# Patient Record
Sex: FEMALE | Race: WHITE | NOT HISPANIC OR LATINO | URBAN - NONMETROPOLITAN AREA
[De-identification: names, ages, dates, MRNs, and addresses within clinical notes are randomized per-mention and may not be internally consistent; named-entity substitution may affect disease eponyms.]

---

## 2021-01-01 ENCOUNTER — APPOINTMENT (RX ONLY)
Dept: URBAN - NONMETROPOLITAN AREA CLINIC 1 | Facility: CLINIC | Age: 41
Setting detail: DERMATOLOGY
End: 2021-01-01

## 2021-01-01 DIAGNOSIS — L82.1 OTHER SEBORRHEIC KERATOSIS: ICD-10-CM

## 2021-01-01 DIAGNOSIS — L57.8 OTHER SKIN CHANGES DUE TO CHRONIC EXPOSURE TO NONIONIZING RADIATION: ICD-10-CM | Status: STABLE

## 2021-01-01 DIAGNOSIS — D22 MELANOCYTIC NEVI: ICD-10-CM

## 2021-01-01 DIAGNOSIS — D18.0 HEMANGIOMA: ICD-10-CM

## 2021-01-01 DIAGNOSIS — L81.4 OTHER MELANIN HYPERPIGMENTATION: ICD-10-CM

## 2021-01-01 DIAGNOSIS — Z41.9 ENCOUNTER FOR PROCEDURE FOR PURPOSES OTHER THAN REMEDYING HEALTH STATE, UNSPECIFIED: ICD-10-CM

## 2021-01-01 PROCEDURE — ? ADDITIONAL NOTES

## 2021-01-01 PROCEDURE — 99203 OFFICE O/P NEW LOW 30 MIN: CPT

## 2021-01-01 PROCEDURE — ? TREATMENT REGIMEN

## 2021-01-01 PROCEDURE — ? BOTOX

## 2021-01-01 PROCEDURE — ? OBSERVATION AND MEASURE

## 2021-01-01 PROCEDURE — ? COUNSELING

## 2021-01-01 PROCEDURE — ? FULL BODY SKIN EXAM

## 2021-01-01 PROCEDURE — ? BELOTERO INJECTION

## 2021-01-01 ASSESSMENT — LOCATION ZONE DERM
LOCATION ZONE: FACE
LOCATION ZONE: ARM
LOCATION ZONE: TRUNK
LOCATION ZONE: LEG

## 2021-01-01 ASSESSMENT — LOCATION DETAILED DESCRIPTION DERM
LOCATION DETAILED: RIGHT SUPERIOR UPPER BACK
LOCATION DETAILED: LEFT INFERIOR MEDIAL UPPER BACK
LOCATION DETAILED: LEFT ANTERIOR LATERAL DISTAL THIGH
LOCATION DETAILED: LEFT VENTRAL PROXIMAL FOREARM
LOCATION DETAILED: RIGHT ANTERIOR DISTAL THIGH
LOCATION DETAILED: LEFT MEDIAL FOREHEAD
LOCATION DETAILED: RIGHT MID-UPPER BACK
LOCATION DETAILED: LEFT CENTRAL EYEBROW
LOCATION DETAILED: RIGHT MEDIAL SUPERIOR CHEST
LOCATION DETAILED: RIGHT VENTRAL PROXIMAL FOREARM
LOCATION DETAILED: LEFT SUPERIOR MEDIAL MIDBACK

## 2021-01-01 ASSESSMENT — LOCATION SIMPLE DESCRIPTION DERM
LOCATION SIMPLE: RIGHT FOREARM
LOCATION SIMPLE: LEFT FOREHEAD
LOCATION SIMPLE: LEFT EYEBROW
LOCATION SIMPLE: LEFT LOWER BACK
LOCATION SIMPLE: LEFT FOREARM
LOCATION SIMPLE: CHEST
LOCATION SIMPLE: LEFT THIGH
LOCATION SIMPLE: LEFT UPPER BACK
LOCATION SIMPLE: RIGHT UPPER BACK
LOCATION SIMPLE: RIGHT THIGH

## 2021-05-24 PROBLEM — D18.01 HEMANGIOMA OF SKIN AND SUBCUTANEOUS TISSUE: Status: ACTIVE | Noted: 2021-01-01

## 2021-05-24 PROBLEM — D22.39 MELANOCYTIC NEVI OF OTHER PARTS OF FACE: Status: ACTIVE | Noted: 2021-01-01

## 2021-05-24 PROBLEM — L81.4 OTHER MELANIN HYPERPIGMENTATION: Status: ACTIVE | Noted: 2021-01-01

## 2021-05-24 PROBLEM — D22.5 MELANOCYTIC NEVI OF TRUNK: Status: ACTIVE | Noted: 2021-01-01

## 2021-05-24 PROBLEM — L57.8 OTHER SKIN CHANGES DUE TO CHRONIC EXPOSURE TO NONIONIZING RADIATION: Status: ACTIVE | Noted: 2021-01-01

## 2021-05-24 PROBLEM — L82.1 OTHER SEBORRHEIC KERATOSIS: Status: ACTIVE | Noted: 2021-01-01

## 2021-05-24 NOTE — PROCEDURE: TREATMENT REGIMEN
Detail Level: Detailed
Plan: Advised pt to return if lesion is not healed. BX if lesion is not gone.

## 2021-06-09 PROBLEM — Z41.9 ENCOUNTER FOR PROCEDURE FOR PURPOSES OTHER THAN REMEDYING HEALTH STATE, UNSPECIFIED: Status: ACTIVE | Noted: 2021-01-01

## 2021-06-09 NOTE — PROCEDURE: BOTOX
Post-Care Instructions: Patient instructed do not lie down or bend over 4 hours after treatment. Do not manipulate treatment area and limit physical activity for 24 hours. Patient instructed not to travel by airplane for 48 hours. It can take 3-11 days to start seeing results. Please call if you have any concerns.\\n\\nResults with botox take 5 days to appear and improve up to 2 weeks post injection.  \\n\\nCall the clinic if any questions or concerns arise at [phone]. Post-Care Instructions: Patient instructed do not lie down or bend over 4 hours after treatment. Do not manipulate treatment area and limit physical activity for 24 hours. Patient instructed not to travel by airplane for 48 hours. It can take 3-11 days to start seeing results. Please call if you have any concerns.\\n\\nResults with botox take 5 days to appear and improve up to 2 weeks post injection.  \\n\\nCall the clinic if any questions or concerns arise at [phone].

## 2021-07-13 PROBLEM — Z41.9 ENCOUNTER FOR PROCEDURE FOR PURPOSES OTHER THAN REMEDYING HEALTH STATE, UNSPECIFIED: Status: ACTIVE | Noted: 2021-01-01

## 2021-07-13 NOTE — PROCEDURE: BOTOX
Post-Care Instructions: Patient instructed do not lie down or bend over 4 hours after treatment. Do not manipulate treatment area and limit physical activity for 24 hours. Patient instructed not to travel by airplane for 48 hours. It can take 3-11 days to start seeing results. Please call if you have any concerns.\\n\\nResults with botox take 5 days to appear and improve up to 2 weeks post injection.  \\n\\nCall the clinic if any questions or concerns arise at [phone]. Post-Care Instructions: Patient instructed do not lie down or bend over 4 hours after treatment. Do not manipulate treatment area and limit physical activity for 24 hours. Patient instructed not to travel by airplane for 48 hours. It can take 3-11 days to start seeing results. Please call if you have any concerns.\\n\\nResults with botox take 5 days to appear and improve up to 2 weeks post injection.  \\n\\nCall the clinic if any questions or concerns arise at [phone].

## 2021-07-13 NOTE — PROCEDURE: BELOTERO INJECTION
Price (Use Numbers Only, No Special Characters Or $): 261 Price (Use Numbers Only, No Special Characters Or $): 812